# Patient Record
Sex: MALE | Race: WHITE | URBAN - METROPOLITAN AREA
[De-identification: names, ages, dates, MRNs, and addresses within clinical notes are randomized per-mention and may not be internally consistent; named-entity substitution may affect disease eponyms.]

---

## 2021-12-03 ENCOUNTER — OFFICE VISIT (OUTPATIENT)
Dept: URBAN - METROPOLITAN AREA CLINIC 27 | Facility: CLINIC | Age: 51
End: 2021-12-03
Payer: MEDICARE

## 2021-12-03 DIAGNOSIS — E11.3512 TYPE 2 DIAB WITH PROLIF DIAB RTNOP WITH MACULAR EDEMA, L EYE: ICD-10-CM

## 2021-12-03 DIAGNOSIS — H33.8 OTHER RETINAL DETACHMENTS: ICD-10-CM

## 2021-12-03 DIAGNOSIS — H04.123 DRY EYE SYNDROME OF BILATERAL LACRIMAL GLANDS: ICD-10-CM

## 2021-12-03 DIAGNOSIS — Z96.1 PRESENCE OF INTRAOCULAR LENS: ICD-10-CM

## 2021-12-03 PROCEDURE — 99204 OFFICE O/P NEW MOD 45 MIN: CPT | Performed by: OPHTHALMOLOGY

## 2021-12-03 PROCEDURE — 92134 CPTRZ OPH DX IMG PST SGM RTA: CPT | Performed by: OPHTHALMOLOGY

## 2021-12-03 ASSESSMENT — INTRAOCULAR PRESSURE
OD: 17
OS: 5

## 2021-12-03 NOTE — IMPRESSION/PLAN
Impression: h/o RD, OD. Status: Symptomatic.
s/p PPL/ PPV/ MS/ PRP/ IOL 05/12/09 ([facility]) Plan: [facility]. Observe.

## 2021-12-03 NOTE — IMPRESSION/PLAN
Impression: h/o Vitreous hemorrhage w/ TRD, OS. Status: Symptomatic.
s/p PPV / Brigitte Stacks / EL / GAS 12/11/06 ([facility]) Plan: Observe.

## 2021-12-03 NOTE — IMPRESSION/PLAN
Impression: h/o RD, OS. Status: Symptomatic.
s/p PPV / SB/ MS/ EL/ SO / CE 03/28/07 [facility])
s/p PPV/ MS/ PRP/ SO / Post Cap 12/17/07 ([address] [place] [address]) Plan: There is a SO oil bubble OS in the AC. No corneal toxicity at this time. Low visual potential OS. Retina flat under SO OS. Observe.

## 2021-12-03 NOTE — IMPRESSION/PLAN
Impression: PDR, OD. Status: Symptomatic. Plan: Patient was lost to follow up. Exam and OCT reveal no DME OD. Observe. Thanks, Job Bal Return in 2 months for IVFA OD 1st, OCT OU, then in 6 months if stable

## 2022-03-11 ENCOUNTER — OFFICE VISIT (OUTPATIENT)
Dept: URBAN - METROPOLITAN AREA CLINIC 27 | Facility: CLINIC | Age: 52
End: 2022-03-11
Payer: MEDICARE

## 2022-03-11 DIAGNOSIS — H43.12 VITREOUS HEMORRHAGE, LEFT EYE: ICD-10-CM

## 2022-03-11 DIAGNOSIS — H35.372 PUCKERING OF MACULA, LEFT EYE: ICD-10-CM

## 2022-03-11 DIAGNOSIS — E11.3591 TYPE 2 DIAB WITH PROLIF DIAB RTNOP WITHOUT MCLR EDEMA, R EYE: Primary | ICD-10-CM

## 2022-03-11 DIAGNOSIS — H27.02 APHAKIA, LEFT EYE: ICD-10-CM

## 2022-03-11 PROCEDURE — 92134 CPTRZ OPH DX IMG PST SGM RTA: CPT | Performed by: OPHTHALMOLOGY

## 2022-03-11 PROCEDURE — 99214 OFFICE O/P EST MOD 30 MIN: CPT | Performed by: OPHTHALMOLOGY

## 2022-03-11 ASSESSMENT — INTRAOCULAR PRESSURE
OS: 6
OD: 10

## 2022-03-11 NOTE — IMPRESSION/PLAN
Impression: h/o Vitreous hemorrhage w/ TRD, OS. Status: Symptomatic.
s/p PPV / Celina Grams / EL / GAS 12/11/06 ([facility]) Plan: Observe.

## 2022-03-11 NOTE — IMPRESSION/PLAN
Impression: h/o RD, OS. Status: Symptomatic.
s/p PPV / SB/ MS/ EL/ SO / CE 03/28/07 [facility])
s/p PPV/ MS/ PRP/ SO / Post Cap 12/17/07 ([address] [place] [address]) Plan: There is a SO oil bubble OS in the AC. No corneal toxicity at this time. Low visual potential OS. Retina flat under SO OS. Observe.

## 2022-03-11 NOTE — IMPRESSION/PLAN
Impression: PDR, OD. Status: Symptomatic. PDR OD Plan: Peripheral exam reveals no tears. Exam and OCT reveal no DME OD. Observe. Thanks, Magdalena Begum Return in 6 months for OCT OU, IVFA OD 1st